# Patient Record
Sex: MALE | Race: WHITE | NOT HISPANIC OR LATINO | URBAN - METROPOLITAN AREA
[De-identification: names, ages, dates, MRNs, and addresses within clinical notes are randomized per-mention and may not be internally consistent; named-entity substitution may affect disease eponyms.]

---

## 2020-07-25 ENCOUNTER — TELEPHONE ENCOUNTER (OUTPATIENT)
Dept: URBAN - METROPOLITAN AREA CLINIC 13 | Facility: CLINIC | Age: 65
End: 2020-07-25

## 2020-07-25 RX ORDER — TEMAZEPAM 15 MG/1
TAKE 1 CAPSULE AT BEDTIME CAPSULE ORAL
Qty: 30 | Refills: 0 | OUTPATIENT
Start: 2012-10-09 | End: 2017-10-10

## 2020-07-26 ENCOUNTER — TELEPHONE ENCOUNTER (OUTPATIENT)
Dept: URBAN - METROPOLITAN AREA CLINIC 13 | Facility: CLINIC | Age: 65
End: 2020-07-26

## 2020-07-26 RX ORDER — ROSUVASTATIN CALCIUM 20 MG
TAKE 1 TABLET DAILY TABLET ORAL
Refills: 0 | Status: ACTIVE | COMMUNITY

## 2020-07-26 RX ORDER — DUTASTERIDE 0.5 MG
CAPSULE ORAL
Qty: 30 | Refills: 0 | Status: ACTIVE | COMMUNITY
Start: 2012-12-06

## 2022-05-03 ENCOUNTER — NEW PATIENT (OUTPATIENT)
Dept: URBAN - METROPOLITAN AREA CLINIC 40 | Facility: CLINIC | Age: 67
End: 2022-05-03

## 2022-05-03 DIAGNOSIS — Z46.0: ICD-10-CM

## 2022-05-03 DIAGNOSIS — H52.03: ICD-10-CM

## 2022-05-03 DIAGNOSIS — H52.223: ICD-10-CM

## 2022-05-03 DIAGNOSIS — Z01.00: ICD-10-CM

## 2022-05-03 PROCEDURE — 92310-2 LEVEL 2 CONTACT LENS MANAGEMENT

## 2022-05-03 PROCEDURE — 92004 COMPRE OPH EXAM NEW PT 1/>: CPT

## 2022-05-03 PROCEDURE — 92499OP2 OPTOMAP RETINAL SCREENING BOTH EYES

## 2022-05-03 PROCEDURE — 92015 DETERMINE REFRACTIVE STATE: CPT

## 2022-05-03 ASSESSMENT — KERATOMETRY
OS_AXISANGLE_DEGREES: 80
OD_AXISANGLE_DEGREES: 180
OD_K2POWER_DIOPTERS: 45.25
OS_AXISANGLE2_DEGREES: 170
OS_K1POWER_DIOPTERS: 44.00
OD_AXISANGLE2_DEGREES: 090
OS_K2POWER_DIOPTERS: 45.00
OD_K1POWER_DIOPTERS: 44.25

## 2022-05-03 ASSESSMENT — VISUAL ACUITY
OU_SC: J2
OD_SC: 20/30-2
OU_SC: 20/30
OS_SC: 20/30+2
OD_SC: J8-
OS_SC: J3

## 2022-05-03 ASSESSMENT — TONOMETRY
OS_IOP_MMHG: 15
OD_IOP_MMHG: 17

## 2022-05-03 NOTE — PATIENT DISCUSSION
Disp trial 1 and trial 2 OD (mono and DVO for tennis) order Trial 3 (mono OD and change in distance Rx OS). Disp lenses have same Dist Rx OS as old lenses.

## 2022-05-03 NOTE — PATIENT DISCUSSION
Patient was going boating after examination and deferred dilation, pt OK doing optomap at today's examination. Recommend pt RTC for dilated fundus examination.

## 2022-05-03 NOTE — PATIENT DISCUSSION
If having issues with distance do +0.50 (ordered lens) OS, if having issues with near might need to change to myday (or higher daily lens).

## 2023-03-24 ENCOUNTER — TELEPHONE ENCOUNTER (OUTPATIENT)
Dept: URBAN - METROPOLITAN AREA CLINIC 13 | Facility: CLINIC | Age: 68
End: 2023-03-24

## 2023-07-20 ENCOUNTER — COMPREHENSIVE EXAM (OUTPATIENT)
Dept: URBAN - METROPOLITAN AREA CLINIC 40 | Facility: CLINIC | Age: 68
End: 2023-07-20

## 2023-07-20 DIAGNOSIS — Z46.0: ICD-10-CM

## 2023-07-20 DIAGNOSIS — H52.03: ICD-10-CM

## 2023-07-20 DIAGNOSIS — H52.4: ICD-10-CM

## 2023-07-20 DIAGNOSIS — H52.223: ICD-10-CM

## 2023-07-20 PROCEDURE — 92014 COMPRE OPH EXAM EST PT 1/>: CPT

## 2023-07-20 PROCEDURE — 92310-2 LEVEL 2 CONTACT LENS MANAGEMENT

## 2023-07-20 PROCEDURE — 92015 DETERMINE REFRACTIVE STATE: CPT

## 2023-07-20 ASSESSMENT — KERATOMETRY
OS_AXISANGLE_DEGREES: 165
OD_AXISANGLE2_DEGREES: 090
OD_K1POWER_DIOPTERS: 44.25
OS_K1POWER_DIOPTERS: 44.00
OD_K2POWER_DIOPTERS: 44.0
OS_K2POWER_DIOPTERS: 44.5
OS_K2POWER_DIOPTERS: 45.00
OD_AXISANGLE2_DEGREES: 88
OD_K2POWER_DIOPTERS: 45.25
OS_AXISANGLE2_DEGREES: 170
OS_AXISANGLE_DEGREES: 80
OS_K1POWER_DIOPTERS: 45.0
OD_K1POWER_DIOPTERS: 45.0
OD_AXISANGLE_DEGREES: 180
OS_AXISANGLE2_DEGREES: 75
OD_AXISANGLE_DEGREES: 178

## 2023-07-20 ASSESSMENT — VISUAL ACUITY
OS_SC: J10
OD_SC: 20/200-1
OS_SC: 20/50-2
OD_SC: J12

## 2023-07-20 ASSESSMENT — TONOMETRY
OD_IOP_MMHG: 18
OS_IOP_MMHG: 22

## 2023-09-15 ENCOUNTER — FOLLOW UP (OUTPATIENT)
Dept: URBAN - METROPOLITAN AREA CLINIC 40 | Facility: CLINIC | Age: 68
End: 2023-09-15

## 2023-09-15 DIAGNOSIS — Z46.0: ICD-10-CM

## 2023-09-15 PROCEDURE — 92310F

## 2023-09-15 ASSESSMENT — KERATOMETRY
OS_K1POWER_DIOPTERS: 45.0
OS_K1POWER_DIOPTERS: 44.00
OD_AXISANGLE2_DEGREES: 88
OD_AXISANGLE_DEGREES: 178
OD_AXISANGLE2_DEGREES: 090
OD_K2POWER_DIOPTERS: 45.25
OD_K2POWER_DIOPTERS: 44.0
OD_AXISANGLE_DEGREES: 180
OS_AXISANGLE_DEGREES: 80
OS_AXISANGLE_DEGREES: 165
OD_K1POWER_DIOPTERS: 45.0
OD_K1POWER_DIOPTERS: 44.25
OS_AXISANGLE2_DEGREES: 170
OS_K2POWER_DIOPTERS: 44.5
OS_K2POWER_DIOPTERS: 45.00
OS_AXISANGLE2_DEGREES: 75

## 2023-09-15 ASSESSMENT — VISUAL ACUITY
OD_CC: 20/20-1
OU_CC: 20/25-1
OS_CC: 20/25

## 2025-02-20 ENCOUNTER — COMPREHENSIVE EXAM (OUTPATIENT)
Age: 70
End: 2025-02-20

## 2025-02-20 DIAGNOSIS — H52.03: ICD-10-CM

## 2025-02-20 DIAGNOSIS — H16.223: ICD-10-CM

## 2025-02-20 DIAGNOSIS — H40.013: ICD-10-CM

## 2025-02-20 DIAGNOSIS — H25.813: ICD-10-CM

## 2025-02-20 PROCEDURE — 92015 DETERMINE REFRACTIVE STATE: CPT

## 2025-02-20 PROCEDURE — 92014 COMPRE OPH EXAM EST PT 1/>: CPT

## 2025-02-20 PROCEDURE — 92310-1 LEVEL 1 SOFT LENS UPDATE
